# Patient Record
Sex: MALE
[De-identification: names, ages, dates, MRNs, and addresses within clinical notes are randomized per-mention and may not be internally consistent; named-entity substitution may affect disease eponyms.]

---
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
## 2025-07-10 ENCOUNTER — EMERGENCY (EMERGENCY)
Age: 20
LOS: 1 days | End: 2025-07-10
Attending: EMERGENCY MEDICINE | Admitting: EMERGENCY MEDICINE
Payer: COMMERCIAL

## 2025-07-10 VITALS
HEART RATE: 64 BPM | RESPIRATION RATE: 19 BRPM | DIASTOLIC BLOOD PRESSURE: 65 MMHG | TEMPERATURE: 99 F | SYSTOLIC BLOOD PRESSURE: 128 MMHG | OXYGEN SATURATION: 99 %

## 2025-07-10 VITALS
HEART RATE: 64 BPM | RESPIRATION RATE: 18 BRPM | DIASTOLIC BLOOD PRESSURE: 73 MMHG | TEMPERATURE: 99 F | OXYGEN SATURATION: 97 % | SYSTOLIC BLOOD PRESSURE: 121 MMHG

## 2025-07-10 LAB
ALBUMIN SERPL ELPH-MCNC: 4 G/DL — SIGNIFICANT CHANGE UP (ref 3.3–5)
ALP SERPL-CCNC: 60 U/L — SIGNIFICANT CHANGE UP (ref 40–120)
ALT FLD-CCNC: 11 U/L — SIGNIFICANT CHANGE UP (ref 4–41)
ANION GAP SERPL CALC-SCNC: 14 MMOL/L — SIGNIFICANT CHANGE UP (ref 7–14)
ANISOCYTOSIS BLD QL: SLIGHT — SIGNIFICANT CHANGE UP
APPEARANCE UR: CLEAR — SIGNIFICANT CHANGE UP
AST SERPL-CCNC: 17 U/L — SIGNIFICANT CHANGE UP (ref 4–40)
BACTERIA # UR AUTO: NEGATIVE /HPF — SIGNIFICANT CHANGE UP
BASOPHILS # BLD AUTO: 0.03 K/UL — SIGNIFICANT CHANGE UP (ref 0–0.2)
BASOPHILS # BLD MANUAL: 0.16 K/UL — SIGNIFICANT CHANGE UP (ref 0–0.2)
BASOPHILS NFR BLD AUTO: 0.5 % — SIGNIFICANT CHANGE UP (ref 0–2)
BASOPHILS NFR BLD MANUAL: 2.6 % — HIGH (ref 0–2)
BILIRUB SERPL-MCNC: 0.4 MG/DL — SIGNIFICANT CHANGE UP (ref 0.2–1.2)
BILIRUB UR-MCNC: NEGATIVE — SIGNIFICANT CHANGE UP
BUN SERPL-MCNC: 6 MG/DL — LOW (ref 7–23)
CALCIUM SERPL-MCNC: 9.3 MG/DL — SIGNIFICANT CHANGE UP (ref 8.4–10.5)
CAST: 0 /LPF — SIGNIFICANT CHANGE UP (ref 0–4)
CHLORIDE SERPL-SCNC: 102 MMOL/L — SIGNIFICANT CHANGE UP (ref 98–107)
CO2 SERPL-SCNC: 24 MMOL/L — SIGNIFICANT CHANGE UP (ref 22–31)
COLOR SPEC: YELLOW — SIGNIFICANT CHANGE UP
CREAT SERPL-MCNC: 1.29 MG/DL — SIGNIFICANT CHANGE UP (ref 0.5–1.3)
DIFF PNL FLD: ABNORMAL
EGFR: 81 ML/MIN/1.73M2 — SIGNIFICANT CHANGE UP
EGFR: 81 ML/MIN/1.73M2 — SIGNIFICANT CHANGE UP
EOSINOPHIL # BLD AUTO: 0.12 K/UL — SIGNIFICANT CHANGE UP (ref 0–0.5)
EOSINOPHIL # BLD MANUAL: 0.21 K/UL — SIGNIFICANT CHANGE UP (ref 0–0.5)
EOSINOPHIL NFR BLD AUTO: 1.9 % — SIGNIFICANT CHANGE UP (ref 0–6)
EOSINOPHIL NFR BLD MANUAL: 3.4 % — SIGNIFICANT CHANGE UP (ref 0–6)
GIANT PLATELETS BLD QL SMEAR: PRESENT
GLUCOSE SERPL-MCNC: 79 MG/DL — SIGNIFICANT CHANGE UP (ref 70–99)
GLUCOSE UR QL: NEGATIVE MG/DL — SIGNIFICANT CHANGE UP
HCT VFR BLD CALC: 45.1 % — SIGNIFICANT CHANGE UP (ref 39–50)
HGB BLD-MCNC: 14.6 G/DL — SIGNIFICANT CHANGE UP (ref 13–17)
IMM GRANULOCYTES # BLD AUTO: 0.01 K/UL — SIGNIFICANT CHANGE UP (ref 0–0.07)
IMM GRANULOCYTES NFR BLD AUTO: 0.2 % — SIGNIFICANT CHANGE UP (ref 0–0.9)
KETONES UR QL: 15 MG/DL
LEUKOCYTE ESTERASE UR-ACNC: NEGATIVE — SIGNIFICANT CHANGE UP
LIDOCAIN IGE QN: 11 U/L — SIGNIFICANT CHANGE UP (ref 7–60)
LYMPHOCYTES # BLD AUTO: 2.24 K/UL — SIGNIFICANT CHANGE UP (ref 1–3.3)
LYMPHOCYTES # BLD MANUAL: 2.14 K/UL — SIGNIFICANT CHANGE UP (ref 1–3.3)
LYMPHOCYTES NFR BLD AUTO: 35.8 % — SIGNIFICANT CHANGE UP (ref 13–44)
LYMPHOCYTES NFR BLD MANUAL: 34.2 % — SIGNIFICANT CHANGE UP (ref 13–44)
MANUAL NEUTROPHIL BANDS #: 0.27 K/UL — SIGNIFICANT CHANGE UP (ref 0–0.84)
MCHC RBC-ENTMCNC: 25.9 PG — LOW (ref 27–34)
MCHC RBC-ENTMCNC: 32.4 G/DL — SIGNIFICANT CHANGE UP (ref 32–36)
MCV RBC AUTO: 80 FL — SIGNIFICANT CHANGE UP (ref 80–100)
MICROCYTES BLD QL: SLIGHT — SIGNIFICANT CHANGE UP
MONOCYTES # BLD AUTO: 1.18 K/UL — HIGH (ref 0–0.9)
MONOCYTES # BLD MANUAL: 0.64 K/UL — SIGNIFICANT CHANGE UP (ref 0–0.9)
MONOCYTES NFR BLD AUTO: 18.9 % — HIGH (ref 2–14)
MONOCYTES NFR BLD MANUAL: 10.3 % — SIGNIFICANT CHANGE UP (ref 2–14)
NEUTROPHILS # BLD AUTO: 2.67 K/UL — SIGNIFICANT CHANGE UP (ref 1.8–7.4)
NEUTROPHILS # BLD MANUAL: 2.83 K/UL — SIGNIFICANT CHANGE UP (ref 1.8–7.4)
NEUTROPHILS NFR BLD AUTO: 42.7 % — LOW (ref 43–77)
NEUTROPHILS NFR BLD MANUAL: 45.2 % — SIGNIFICANT CHANGE UP (ref 43–77)
NEUTS BAND # BLD: 4.3 % — SIGNIFICANT CHANGE UP (ref 0–8)
NEUTS BAND NFR BLD: 4.3 % — SIGNIFICANT CHANGE UP (ref 0–8)
NITRITE UR-MCNC: NEGATIVE — SIGNIFICANT CHANGE UP
NRBC # BLD AUTO: 0 K/UL — SIGNIFICANT CHANGE UP (ref 0–0)
NRBC # FLD: 0 K/UL — SIGNIFICANT CHANGE UP (ref 0–0)
NRBC BLD AUTO-RTO: 0 /100 WBCS — SIGNIFICANT CHANGE UP (ref 0–0)
OVALOCYTES BLD QL SMEAR: SLIGHT — SIGNIFICANT CHANGE UP
PH UR: 7 — SIGNIFICANT CHANGE UP (ref 5–8)
PLAT MORPH BLD: NORMAL — SIGNIFICANT CHANGE UP
PLATELET # BLD AUTO: 205 K/UL — SIGNIFICANT CHANGE UP (ref 150–400)
PLATELET COUNT - ESTIMATE: NORMAL — SIGNIFICANT CHANGE UP
PMV BLD: 10.6 FL — SIGNIFICANT CHANGE UP (ref 7–13)
POIKILOCYTOSIS BLD QL AUTO: SLIGHT — SIGNIFICANT CHANGE UP
POTASSIUM SERPL-MCNC: 4 MMOL/L — SIGNIFICANT CHANGE UP (ref 3.5–5.3)
POTASSIUM SERPL-SCNC: 4 MMOL/L — SIGNIFICANT CHANGE UP (ref 3.5–5.3)
PROT SERPL-MCNC: 7.3 G/DL — SIGNIFICANT CHANGE UP (ref 6–8.3)
PROT UR-MCNC: NEGATIVE MG/DL — SIGNIFICANT CHANGE UP
RBC # BLD: 5.64 M/UL — SIGNIFICANT CHANGE UP (ref 4.2–5.8)
RBC # FLD: 12.4 % — SIGNIFICANT CHANGE UP (ref 10.3–14.5)
RBC BLD AUTO: ABNORMAL
RBC CASTS # UR COMP ASSIST: 4 /HPF — SIGNIFICANT CHANGE UP (ref 0–4)
SMUDGE CELLS # BLD: PRESENT
SODIUM SERPL-SCNC: 140 MMOL/L — SIGNIFICANT CHANGE UP (ref 135–145)
SP GR SPEC: 1.01 — SIGNIFICANT CHANGE UP (ref 1–1.03)
SQUAMOUS # UR AUTO: 0 /HPF — SIGNIFICANT CHANGE UP (ref 0–5)
UROBILINOGEN FLD QL: 0.2 MG/DL — SIGNIFICANT CHANGE UP (ref 0.2–1)
WBC # BLD: 6.25 K/UL — SIGNIFICANT CHANGE UP (ref 3.8–10.5)
WBC # FLD AUTO: 6.25 K/UL — SIGNIFICANT CHANGE UP (ref 3.8–10.5)
WBC UR QL: 0 /HPF — SIGNIFICANT CHANGE UP (ref 0–5)

## 2025-07-10 PROCEDURE — 74177 CT ABD & PELVIS W/CONTRAST: CPT | Mod: 26

## 2025-07-10 PROCEDURE — 99285 EMERGENCY DEPT VISIT HI MDM: CPT

## 2025-07-10 RX ORDER — ACETAMINOPHEN 500 MG/5ML
1000 LIQUID (ML) ORAL ONCE
Refills: 0 | Status: COMPLETED | OUTPATIENT
Start: 2025-07-10 | End: 2025-07-10

## 2025-07-10 RX ADMIN — Medication 400 MILLIGRAM(S): at 20:37

## 2025-07-10 RX ADMIN — Medication 1000 MILLILITER(S): at 18:34

## 2025-07-10 NOTE — ED PROVIDER NOTE - CLINICAL SUMMARY MEDICAL DECISION MAKING FREE TEXT BOX
20 year old man no PMH presenting with 2 days of RLQ pain, associated with diarrhea, no fevers, chills, n/v but does have decreasde appetite, no testicular pain or urinary symptoms. vitals WNL, physical exam notable for isolated RLQ tenderness over mcburneys point without rebound, concern for appendicitis vs gastrioenteritis vs colitis vs pancreatitis vs UTI, unlikely testicular torsion. cbc, cmp, lipase, UA, CTAP. 20 year old man no PMH presenting with 2 days of RLQ pain, associated with diarrhea, no fevers, chills, n/v but does have decreased appetite, no testicular pain or urinary symptoms. vitals WNL, physical exam notable for isolated RLQ tenderness over McBurney's point without rebound, concern for appendicitis vs gastroenteritis vs colitis vs pancreatitis vs UTI, unlikely testicular torsion. cbc, cmp, lipase, UA, CTAP.NPO

## 2025-07-10 NOTE — ED ADULT NURSE NOTE - OBJECTIVE STATEMENT
Received pt in bed A and Ox 3in NAD resting comfortably, c.o RLQ abd pain with episodes of diarrhea, reports pain is worse after food ingestion, denies blood in stool, abd soft non distended, tender to RLQ, Iv initiated 20 G right Ac albs drawn and sent, IVF initiated as per order.

## 2025-07-10 NOTE — ED ADULT NURSE REASSESSMENT NOTE - SYMPTOMS
c.o. rt lower abd pain x 3 days. denies fevers, vomiting + diarrhea. pain currently 6/10./abdominal pain
states pain is alittle better after IV tylenol

## 2025-07-10 NOTE — ED ADULT NURSE NOTE - NSFALLUNIVINTERV_ED_ALL_ED
Bed/Stretcher in lowest position, wheels locked, appropriate side rails in place/Call bell, personal items and telephone in reach/Instruct patient to call for assistance before getting out of bed/chair/stretcher/Non-slip footwear applied when patient is off stretcher/[place] to call system/Physically safe environment - no spills, clutter or unnecessary equipment/Purposeful proactive rounding/Room/bathroom lighting operational, light cord in reach

## 2025-07-10 NOTE — ED PROVIDER NOTE - NSFOLLOWUPINSTRUCTIONS_ED_ALL_ED_FT
You were seen in the Emergency Department for abdominal pain and diarrhea. Your labwork and imaging did not show any emergencies. Continue to eat and drink to replace what you lose from the diarrhea. For pain you can take 1000mg of tylenol every 6 hours, no more than 4000mg in one day. Follow up with your primary care doctor and with a gastroenterologist.     Return to the Emergency Department if you experience severe pain not controlled with medications, inabiltiy to eat or drink, the diarrhea becomes bloody, or you have significantly worsening diarrhea.

## 2025-07-10 NOTE — ED PROVIDER NOTE - PATIENT PORTAL LINK FT
You can access the FollowMyHealth Patient Portal offered by [facility] by registering at the following website: [email or web address]. By joining [facility]’s FollowMyHealth portal, you will also be able to view your health information using other applications (apps) compatible with our system.

## 2025-07-10 NOTE — ED PROVIDER NOTE - OBJECTIVE STATEMENT
20 year old man no PMH presenting with 2 days of RLQ pain. the RLQ pain began 2 days ago, constant but worsens occasionally in waves, associated with NB diarrhea. Denies n/v, denies fevers or chills. does report decreased appetite. denies testicular pain, dysuria, or increased urinary frequency. denies back pain. denies chest pain, sob, recent travel, sick contacts. 20 year old man no PMH presenting with 2 days of RLQ pain. the RLQ pain began 2 days ago, constant but worsens occasionally in waves, associated with NB diarrhea. Denies n/v, denies fevers or chills. does report decreased appetite. denies testicular pain, dysuria, or increased urinary frequency. denies back pain. denies chest pain, sob, recent travel, sick contacts.    Attending/Angelia: 20-year-old male as described above presents with 2 days of  right lower quadrant pain, described as sharp, exacerbated with movement associate with nausea and nonbloody diarrhea.  He denies fever/chills, change in urinary habits, testicular pain, penile discharge, weakness or lightheadedness.

## 2025-07-10 NOTE — ED PROVIDER NOTE - PHYSICAL EXAMINATION
Physical Exam:  Gen: no acute distress, AOx3, nontoxic appearing  Head: NCAT  HEENT: EOMI, PEERLA, normal conjunctiva, tongue midline, oral mucosa moist  Lung: CTAB, no respiratory distress, no wheezes/rhonchi/rales B/L, speaking in full sentences  CV: RRR, no murmurs, rubs or gallops  Abd: soft, RLQ tenderness over McBurneys point, ND, no guarding, no rigidity, no rebound tenderness, no CVA tenderness  MSK: no visible deformities, ROM normal in UE/LE, no neck / back pain, calf tenderness  Neuro: No focal sensory or motor deficits in cranial nerves, upper and lower extremities  Skin: Warm, well perfused, no rash, no leg swelling Physical Exam:  Gen: no acute distress, AOx3, nontoxic appearing  Head: NCAT  HEENT: EOMI, PEERLA, normal conjunctiva, tongue midline, oral mucosa moist  Lung: CTAB, no respiratory distress, no wheezes/rhonchi/rales B/L, speaking in full sentences  CV: RRR, no murmurs, rubs or gallops  Abd: soft, RLQ tenderness over McBurneys point, ND, no guarding, no rigidity, no rebound tenderness, no CVA tenderness  MSK: no visible deformities, ROM normal in UE/LE, no neck / back pain, calf tenderness  Neuro: No focal sensory or motor deficits in cranial nerves, upper and lower extremities  Skin: Warm, well perfused, no rash, no leg swelling    Attending/Angelia: Well-appearing, NAD; PERRL/EOMI, non-icterus, supple, no MARK, no JVD, RRR, CTAB; Abd-soft, +RLQ PT w/ vol guarding, no rebound, no HSM, no CVAT; no LE edema, A&Ox3, nonfocal; Skin-warm/dry

## 2025-07-10 NOTE — ED PEDIATRIC TRIAGE NOTE - CHIEF COMPLAINT QUOTE
c/o abdominal pain starting 2 days ago with diarrhea. denies vomiting. denies fever. NKDA. Denies PMHx. IUTD. Easy WOB noted.

## 2025-07-10 NOTE — ED STATDOCS - OBJECTIVE STATEMENT
I performed a medical screening examination and determined this patient to be medically stable and will transfer to the [facility] adult ED for further care. heart and lung exam done and both did not reveal concerns for immediate intervention. Patient to be escorted by staff to the appropriate location for definitive care.

## 2025-07-10 NOTE — ED PROVIDER NOTE - PROGRESS NOTE DETAILS
Karen VERDUGO PGY2: patient feeling well, discussed results of labwork and imaging, return precautions, follow up, medications.

## 2025-07-11 ENCOUNTER — TRANSCRIPTION ENCOUNTER (OUTPATIENT)
Age: 20
End: 2025-07-11